# Patient Record
Sex: FEMALE | Race: OTHER | HISPANIC OR LATINO | URBAN - METROPOLITAN AREA
[De-identification: names, ages, dates, MRNs, and addresses within clinical notes are randomized per-mention and may not be internally consistent; named-entity substitution may affect disease eponyms.]

---

## 2023-07-17 ENCOUNTER — OUTPATIENT (OUTPATIENT)
Dept: EMERGENCY DEPT | Facility: HOSPITAL | Age: 28
LOS: 1 days | End: 2023-07-17
Payer: COMMERCIAL

## 2023-07-17 VITALS
HEIGHT: 66.54 IN | SYSTOLIC BLOOD PRESSURE: 112 MMHG | RESPIRATION RATE: 20 BRPM | TEMPERATURE: 98 F | WEIGHT: 127.87 LBS | OXYGEN SATURATION: 99 % | HEART RATE: 90 BPM | DIASTOLIC BLOOD PRESSURE: 76 MMHG

## 2023-07-17 DIAGNOSIS — R10.2 PELVIC AND PERINEAL PAIN: ICD-10-CM

## 2023-07-17 LAB
ALBUMIN SERPL ELPH-MCNC: 4.5 G/DL — SIGNIFICANT CHANGE UP (ref 3.3–5)
ALP SERPL-CCNC: 59 U/L — SIGNIFICANT CHANGE UP (ref 40–120)
ALT FLD-CCNC: 11 U/L — SIGNIFICANT CHANGE UP (ref 10–45)
ANION GAP SERPL CALC-SCNC: 12 MMOL/L — SIGNIFICANT CHANGE UP (ref 5–17)
APPEARANCE UR: CLEAR — SIGNIFICANT CHANGE UP
AST SERPL-CCNC: 13 U/L — SIGNIFICANT CHANGE UP (ref 10–40)
BACTERIA # UR AUTO: NEGATIVE — SIGNIFICANT CHANGE UP
BASOPHILS # BLD AUTO: 0 K/UL — SIGNIFICANT CHANGE UP (ref 0–0.2)
BASOPHILS NFR BLD AUTO: 0 % — SIGNIFICANT CHANGE UP (ref 0–2)
BILIRUB SERPL-MCNC: 0.4 MG/DL — SIGNIFICANT CHANGE UP (ref 0.2–1.2)
BILIRUB UR-MCNC: NEGATIVE — SIGNIFICANT CHANGE UP
BLD GP AB SCN SERPL QL: NEGATIVE — SIGNIFICANT CHANGE UP
BUN SERPL-MCNC: 19 MG/DL — SIGNIFICANT CHANGE UP (ref 7–23)
BURR CELLS BLD QL SMEAR: PRESENT — SIGNIFICANT CHANGE UP
CALCIUM SERPL-MCNC: 9.4 MG/DL — SIGNIFICANT CHANGE UP (ref 8.4–10.5)
CHLORIDE SERPL-SCNC: 105 MMOL/L — SIGNIFICANT CHANGE UP (ref 96–108)
CO2 SERPL-SCNC: 20 MMOL/L — LOW (ref 22–31)
COLOR SPEC: YELLOW — SIGNIFICANT CHANGE UP
CREAT SERPL-MCNC: 0.42 MG/DL — LOW (ref 0.5–1.3)
DIFF PNL FLD: NEGATIVE — SIGNIFICANT CHANGE UP
EGFR: 136 ML/MIN/1.73M2 — SIGNIFICANT CHANGE UP
EOSINOPHIL # BLD AUTO: 0 K/UL — SIGNIFICANT CHANGE UP (ref 0–0.5)
EOSINOPHIL NFR BLD AUTO: 0 % — SIGNIFICANT CHANGE UP (ref 0–6)
EPI CELLS # UR: 3 /HPF — SIGNIFICANT CHANGE UP
GLUCOSE SERPL-MCNC: 121 MG/DL — HIGH (ref 70–99)
GLUCOSE UR QL: NEGATIVE — SIGNIFICANT CHANGE UP
HCG SERPL-ACNC: 414.8 MIU/ML — HIGH
HCT VFR BLD CALC: 37 % — SIGNIFICANT CHANGE UP (ref 34.5–45)
HGB BLD-MCNC: 12.1 G/DL — SIGNIFICANT CHANGE UP (ref 11.5–15.5)
HYALINE CASTS # UR AUTO: 1 /LPF — SIGNIFICANT CHANGE UP (ref 0–2)
KETONES UR-MCNC: ABNORMAL
LEUKOCYTE ESTERASE UR-ACNC: NEGATIVE — SIGNIFICANT CHANGE UP
LYMPHOCYTES # BLD AUTO: 0.38 K/UL — LOW (ref 1–3.3)
LYMPHOCYTES # BLD AUTO: 3.5 % — LOW (ref 13–44)
MANUAL SMEAR VERIFICATION: SIGNIFICANT CHANGE UP
MCHC RBC-ENTMCNC: 28.9 PG — SIGNIFICANT CHANGE UP (ref 27–34)
MCHC RBC-ENTMCNC: 32.7 GM/DL — SIGNIFICANT CHANGE UP (ref 32–36)
MCV RBC AUTO: 88.3 FL — SIGNIFICANT CHANGE UP (ref 80–100)
MONOCYTES # BLD AUTO: 0.18 K/UL — SIGNIFICANT CHANGE UP (ref 0–0.9)
MONOCYTES NFR BLD AUTO: 1.7 % — LOW (ref 2–14)
NEUTROPHILS # BLD AUTO: 10.28 K/UL — HIGH (ref 1.8–7.4)
NEUTROPHILS NFR BLD AUTO: 94.8 % — HIGH (ref 43–77)
NITRITE UR-MCNC: NEGATIVE — SIGNIFICANT CHANGE UP
OVALOCYTES BLD QL SMEAR: SLIGHT — SIGNIFICANT CHANGE UP
PH UR: 5.5 — SIGNIFICANT CHANGE UP (ref 5–8)
PLAT MORPH BLD: NORMAL — SIGNIFICANT CHANGE UP
PLATELET # BLD AUTO: 331 K/UL — SIGNIFICANT CHANGE UP (ref 150–400)
POIKILOCYTOSIS BLD QL AUTO: SLIGHT — SIGNIFICANT CHANGE UP
POTASSIUM SERPL-MCNC: 4 MMOL/L — SIGNIFICANT CHANGE UP (ref 3.5–5.3)
POTASSIUM SERPL-SCNC: 4 MMOL/L — SIGNIFICANT CHANGE UP (ref 3.5–5.3)
PROT SERPL-MCNC: 7.6 G/DL — SIGNIFICANT CHANGE UP (ref 6–8.3)
PROT UR-MCNC: ABNORMAL
RBC # BLD: 4.19 M/UL — SIGNIFICANT CHANGE UP (ref 3.8–5.2)
RBC # FLD: 13.9 % — SIGNIFICANT CHANGE UP (ref 10.3–14.5)
RBC BLD AUTO: ABNORMAL
RBC CASTS # UR COMP ASSIST: 2 /HPF — SIGNIFICANT CHANGE UP (ref 0–4)
RH IG SCN BLD-IMP: POSITIVE — SIGNIFICANT CHANGE UP
SODIUM SERPL-SCNC: 137 MMOL/L — SIGNIFICANT CHANGE UP (ref 135–145)
SP GR SPEC: 1.03 — HIGH (ref 1.01–1.02)
UROBILINOGEN FLD QL: NEGATIVE — SIGNIFICANT CHANGE UP
WBC # BLD: 10.84 K/UL — HIGH (ref 3.8–10.5)
WBC # FLD AUTO: 10.84 K/UL — HIGH (ref 3.8–10.5)
WBC UR QL: 2 /HPF — SIGNIFICANT CHANGE UP (ref 0–5)

## 2023-07-17 RX ORDER — ACETAMINOPHEN 500 MG
650 TABLET ORAL ONCE
Refills: 0 | Status: COMPLETED | OUTPATIENT
Start: 2023-07-17 | End: 2023-07-17

## 2023-07-17 RX ORDER — SODIUM CHLORIDE 9 MG/ML
1000 INJECTION, SOLUTION INTRAVENOUS
Refills: 0 | Status: DISCONTINUED | OUTPATIENT
Start: 2023-07-17 | End: 2023-07-18

## 2023-07-17 RX ORDER — SODIUM CHLORIDE 9 MG/ML
500 INJECTION INTRAMUSCULAR; INTRAVENOUS; SUBCUTANEOUS ONCE
Refills: 0 | Status: DISCONTINUED | OUTPATIENT
Start: 2023-07-17 | End: 2023-07-17

## 2023-07-17 RX ORDER — SODIUM CHLORIDE 9 MG/ML
1000 INJECTION INTRAMUSCULAR; INTRAVENOUS; SUBCUTANEOUS ONCE
Refills: 0 | Status: COMPLETED | OUTPATIENT
Start: 2023-07-17 | End: 2023-07-17

## 2023-07-17 RX ADMIN — SODIUM CHLORIDE 1000 MILLILITER(S): 9 INJECTION INTRAMUSCULAR; INTRAVENOUS; SUBCUTANEOUS at 17:16

## 2023-07-17 NOTE — H&P ADULT - ATTENDING COMMENTS
ATTG note    Pt seen with and agree with above PGY2 note  -  # [id number] used for this consult and consent    Pt is a 29 yo P1 @ 4.6 wks by LMP presented to ED for abd pain/n/v.  Pt found to have +HCG and no IUP on us.  Since presenting to ED, pain and n/v has resolved.  Pt has not required pain medications since presenting to ED.      VSS, afebrile  Gen-NAD  Abd-soft, nd, nt, no rebound, no guarding  Pelvic exam as above    Labs -   CBC 10/12/37/331  MBT- A+  HCG-414.8  TVS-Endometrium: 1.9 cm. Thickened, otherwise within normal limits. No   intrauterine gestational sac is identified.  Right ovary: 4.1cm x 2.6 cm x 4.6 cm. Complex cyst measures 2.9 x 2.3 x   2.6 cm. This contains heterogeneous solid-appearing avascular components   as well as cystic components with peripheral circumferential flow,   compatible with a hemorrhagic corpus luteal cyst. Normal arterial and   venous waveforms.  Left ovary: 2.9 cm x 1.5 cm x 1.7 cm. Within normal limits. Normal   arterial and venous waveforms.  Fluid: Moderate  free fluid with scattered low level echoes in the cul de   sac and both adnexa.    29 yo P1 @ 4.6 wk by dates presents to ED with lower abd pain/n/v.  Pt with elevated HCG in w/u.  H/H stable.  VSS.  Abd exam benign.  TVS-no IUP, complex right cyst - hemorrhagic corpus luteal cyst with moderate free fluid.  Pt is hemodynamically stable.  No acute abdomen.  In the setting of elevated HCG and moderate complex free fluid ddx includes not only ruptured cyst but also possible ruptured ectopic.  Explained ddx to pt and mother.  Offered conservative mngt as this is a desired pregnancy - with rpt exam, rpt CBC with obs vs surgical mngt with Dx LSC.  Pt aware that cannot be certain what source of complex pelvic fluid - pregnancy vs cyst and surgery could include disruption of CL cyst that can jeopardize pregnancy.  Risks for surgery d/w pt including bleeding, infection, injury to surrounding tissue/organs like intestine/bladder/major vessels.  Pt opted for Dx LSC.  Pt consented for Dx LSC, possible cystectomy, possible salpingectomy, possible oopherectomy, possible Ex-lap as clinically indicated.  All questions answered with .  Informed consent signed.    -ADmit to GYN to OR  -OR prep  -NPO since 7am    JOJO Jones

## 2023-07-17 NOTE — H&P ADULT - ASSESSMENT
28y  @4w6d by LMP  presents with abdominal pain and nausea/vomiting since this morning, which have since resolved, found to have +bHCG in the ED.  TVUS notable for hemorrhagic corpus luteal cyst and complex free fluid in adnexa without intrauterine or extrauterine gestation noted, c/w PUL.  Patient hemodynamically and clinically stable, physical exam benign.  Differential for PUL includes ectopic pregnancy, ruptured hemorrhagic corpus luteal cyst, and early intrauterine pregnancy.  Patient offered diagnostic laparoscopy and conservative management with monitoring of H/H and vital signs. Patient desires diagnostic laparoscopy.  Neuro: Pain well-controlled with Tylenol PO  CV: Hemodynamically stable. Monitor VC  Pulm: Saturating well on RA.   GI: NPO for OR  : Patient with hemoperitoneum in the setting of a pregnancy of unknown location  - Patient consented for diagnostic laparoscopy, possible cystectomy, possible oophorectomy, possible exploratory laparotomy, other surgically indicated procedures. Risks and benefits reviewed.  We reiterated the alternative of conservative management with repeating CBC. Patient demonstrates understanding and desires diagnostic laparoscopy.  - Case added on urgently  - Patient is NPO since 7a  Heme: No active issues   FEN: LR@125  Endo: No active issues  ID: No active issues  Dispo: OR    Milagro Barahona, PGY2  Patient seen and examined with Dr. Jones

## 2023-07-17 NOTE — CONSULT NOTE ADULT - ASSESSMENT
28y  @4w6d by LMP  presents with abdominal pain and nausea/vomiting since this morning, which have since resolved, found to have +bHCG in the ED.  TVUS notable for hemorrhagic corpus luteal cyst and complex free fluid in adnexa without intrauterine or extrauterine gestation noted, c/w PUL.  Patient hemodynamically and clinically stable, physical exam benign.  Differential for PUL includes ectopic pregnancy, ruptured hemorrhagic corpus luteal cyst, and early intrauterine pregnancy.       - Patient counseled extensively on TVUS and lab findings with  ID#[id number] by Dr. Gray at bedside  - Offered patient diagnostic laparoscopy for evacuation of hemoperitoneum, however, given that patient is hemodynamically stable at this time and this is a highly desired pregnancy, also offered conservative management with serial H/H and abdominal exams to evaluate for change in clinical status indication further bleeding into abdomen  - If clinical status worsens, recommend emergent add-on to OR for diagnostic laparoscopy  - Patient elects to first discuss options with her partner and her mother, who is at bedside  - Further recommendations pending patient decision and clinical evolution    Patient seen and evaluated with WILLIAM Gray PGY4  d/w Dr. Jones, Service Attending  LINNETTE Wharton PGY2   28y  @4w6d by LMP  presents with abdominal pain and nausea/vomiting since this morning, which have since resolved, found to have +bHCG in the ED.  TVUS notable for hemorrhagic corpus luteal cyst and complex free fluid in adnexa without intrauterine or extrauterine gestation noted, c/w PUL.  Patient hemodynamically and clinically stable, physical exam benign.  Differential for PUL includes ectopic pregnancy, ruptured hemorrhagic corpus luteal cyst, and early intrauterine pregnancy.       - Patient counseled extensively on TVUS and lab findings with  ID#[id number] by Dr. Gray at bedside  - Offered patient diagnostic laparoscopy for evacuation of hemoperitoneum, however, given that patient is hemodynamically stable at this time and this is a highly desired pregnancy, also offered conservative management with serial H/H and abdominal exams to evaluate for change in clinical status indication further bleeding into abdomen  - If clinical status worsens, recommend emergent add-on to OR for diagnostic laparoscopy  - Patient elects to first discuss options with her partner and her mother, who is at bedside  - Further recommendations pending patient decision and clinical evolution    Patient seen and evaluated with WILLIAM Gray PGY4  d/w Dr. Jones, Service Attending  LINNETTE Wharton PGY2      ADDENDUM  9:11PM  Patient seen with Dr. Jones at bedside with . Patient desires a diagnostic laparoscopy. Patient consented for diagnostic laparoscopy, possible cystectomy, possible oophorectomy, possible exploratory laparotomy, other surgically indicated procedures. Risks and benefits reviewed.  We reiterated the alternative of conservative management with repeating CBC. Patient demonstrates understanding and desires diagnostic laparoscopy. She is NPO since 7am.    Milagro Barahona, PGY2  Patient seen and examined with Dr. Jones

## 2023-07-17 NOTE — ED PROVIDER NOTE - CHIEF COMPLAINT
The patient is a 28y Female complaining of abdominal pain.
Alert and oriented x 3, normal mood and affect, no apparent risk to self or others.

## 2023-07-17 NOTE — CONSULT NOTE ADULT - SUBJECTIVE AND OBJECTIVE BOX
GYN Consult Note     ID#[id number] used for encounter    28y  LMP  (@4w6d by LMP) presents with abdominal pain and nausea/vomiting since this morning.  Patient endorses "mild" LLQ pain since this AM that has since resolved.  Also endorses 3 episodes of NB/NB emesis this AM.  Reports nausea and vomiting have also since resolved.  Patient has been actively trying to conceive, and this would be a highly desired pregnancy.  Denies vaginal bleeding.  Denies lightheadedness, dizziness, chest pain, shortness of breath, fevers, chills.      OB/GYN HISTORY:   Physician: Patient sees an OBGYN in [place], does not remember the name of the doctor or the address of the practice, last seen 2 months ago  Ob: FT  (2016)  Gyn: Denies fibroids, cysts, abnormal pap smears, STIs; was taking Depo-Provera for 7 years - last dose May 2022, reports regular menses for the past 3 months.    PMH: Denies    PSH: Denies   Meds: Denies  Allergies: NKDA      Vital Signs Last 24 Hrs  T(C): 36.8 (2023 18:54), Max: 36.8 (2023 18:54)  T(F): 98.3 (2023 18:54), Max: 98.3 (2023 18:54)  HR: 89 (2023 18:54) (89 - 90)  BP: 99/63 (2023 18:54) (99/63 - 112/76)  BP(mean): --  RR: 16 (2023 18:54) (16 - 20)  SpO2: 99% (2023 18:54) (99% - 99%)    Parameters below as of 2023 18:54  Patient On (Oxygen Delivery Method): room air      PHYSICAL EXAM:   Gen: NAD, alert and oriented x 3  Cardiovascular: RRR  Respiratory: breathing comfortably on RA  Abd: soft, non-distended, minimal tenderness to palpation in LLQ - not reproducible on subsequent exam  Pelvic: closed/long, no CMT, Uterus: normal size, non tender  Adnexa: non tender, no palpable masses  Speculum Exam: No active bleeding from os.  Physiologic discharge.    Extremities: non-tender b/l, no edema   Skin: warm and well perfused  *Pelvic exam performed with chaperone present: ED RN Keysha    LABS:                        12.1   10.84 )-----------( 331      ( 2023 15:11 )             37.0         137  |  105  |  19  ----------------------------<  121<H>  4.0   |  20<L>  |  0.42<L>    Ca    9.4      2023 15:11    TPro  7.6  /  Alb  4.5  /  TBili  0.4  /  DBili  x   /  AST  13  /  ALT  11  /  AlkPhos  59        Urinalysis Basic - ( 2023 15:43 )    Color: Yellow / Appearance: Clear / S.027 / pH: x  Gluc: x / Ketone: Moderate  / Bili: Negative / Urobili: Negative   Blood: x / Protein: Trace / Nitrite: Negative   Leuk Esterase: Negative / RBC: 2 /hpf / WBC 2 /HPF   Sq Epi: x / Non Sq Epi: x / Bacteria: Negative    bHC.8    RADIOLOGY & ADDITIONAL STUDIES:  ACC: [id number] EXAM:  US DPLX PELVIC   ORDERED BY: GABY DE LA FUENTE     ACC: [id number] EXAM:  US OB TRANSVAGINAL   ORDERED BY: GABY DE LA FUENTE     PROCEDURE DATE:  2023          INTERPRETATION:  CLINICAL INFORMATION: 28 year old first trimester   pregnancy  with pelvic pain    LMP: 2023    B-HC.8    COMPARISON: None available.    TECHNIQUE:  Endovaginal and transabdominal pelvic sonogram. Color and Spectral   Doppler was performed.    FINDINGS:  Uterus: 7.6 x 5.1 x 5.3 cm. The uterus is anteverted. No fibroids are   identified.  Endometrium: 1.9 cm. Thickened, otherwise within normal limits. No   intrauterine gestational sac is identified.    Right ovary: 4.1cm x 2.6 cm x 4.6 cm. Complex cyst measures 2.9 x 2.3 x   2.6 cm. This contains heterogeneous solid-appearing avascular components   as well as cystic components with peripheral circumferential flow,   compatible with a hemorrhagic corpus luteal cyst. Normal arterial and   venous waveforms.  Left ovary: 2.9 cm x 1.5 cm x 1.7 cm. Within normal limits. Normal   arterial and venous waveforms.    Fluid: Moderate  free fluid with scattered low level echoes in the cul de   sac and both adnexa.      IMPRESSION:  No intrauterine or extrauterine pregnancy is visualized. Given positive   beta hCG, this study represents a pregnancy of unknown location.   Recommend clinical correlation and close short interval follow-up with   serial  beta-hCG levels and repeat pelvic ultrasound in 3 to 5 days, or   as clinically warranted.    The right ovary contains a complex cystic structure compatible with a   hemorrhagic corpus luteal cyst. The left ovary is unremarkable.    Moderate minimally complex free fluid within the cul-de-sac and both   adnexa.    These findings were discussed with ANDREW De La Fuente on 2023 at  5:40 PM.    --- End of Report ---           AYUSH KAMARA MD; Resident Radiologist  This document has been electronically signed.  FE PRATHER MD; Attending Radiologist  This document has been electronically signed. 2023  5:46PM

## 2023-07-17 NOTE — H&P ADULT - NSHPPHYSICALEXAM_GEN_ALL_CORE
Vital Signs Last 24 Hrs  T(C): 36.8 (17 Jul 2023 18:54), Max: 36.8 (17 Jul 2023 18:54)  T(F): 98.3 (17 Jul 2023 18:54), Max: 98.3 (17 Jul 2023 18:54)  HR: 89 (17 Jul 2023 18:54) (89 - 90)  BP: 99/63 (17 Jul 2023 18:54) (99/63 - 112/76)  BP(mean): --  RR: 16 (17 Jul 2023 18:54) (16 - 20)  SpO2: 99% (17 Jul 2023 18:54) (99% - 99%)    Parameters below as of 17 Jul 2023 18:54  Patient On (Oxygen Delivery Method): room air      PHYSICAL EXAM:   Gen: NAD, alert and oriented x 3  Cardiovascular: RRR  Respiratory: breathing comfortably on RA  Abd: soft, non-distended, minimal tenderness to palpation in LLQ - not reproducible on subsequent exam  Pelvic: closed/long, no CMT, Uterus: normal size, non tender  Adnexa: non tender, no palpable masses  Speculum Exam: No active bleeding from os.  Physiologic discharge.    Extremities: non-tender b/l, no edema   Skin: warm and well perfused  *Pelvic exam performed with chaperone present: ED RACHEL Chopra

## 2023-07-17 NOTE — ED PROVIDER NOTE - OBJECTIVE STATEMENT
29 y/o female, Slovak speaking,  ID [id number], LMP 6/13, presents to the ER with complaint of pelvic pain, vomiting and nausea x this morning. states 27 y/o female, Tajik speaking,  ID [id number], LMP 6/13, presents to the ER with complaint of pelvic pain, vomiting and nausea x this morning. states she may be pregnant but is unsure. states pain mostly located to pelvic/suprapubic region. does not radiate. states pain has been constant. denies f/d, CP, SOB, HA, dizziness, cough, urinary symptoms.

## 2023-07-17 NOTE — ED PROVIDER NOTE - NS ED ATTENDING STATEMENT MOD
This was a shared visit with the BRUNO. I reviewed and verified the documentation and independently performed the documented:

## 2023-07-17 NOTE — ED PROVIDER NOTE - PHYSICAL EXAMINATION
: chaperoned by ANDREW Ch. normal exam. no cmt. no adnexal tenderness. no ttp during exam. no vaginal discharge or bleeding

## 2023-07-17 NOTE — H&P ADULT - NSHPLABSRESULTS_GEN_ALL_CORE
Please return here or go to the Emergency Department for any concerns or worsening of condition.  If you were prescribed antibiotics, please take them to completion.  If you were prescribed a narcotic medication, do not drive or operate heavy equipment or machinery while taking these medications.  Please follow up with your primary care doctor or specialist as needed.  If you  smoke, please stop smoking.  Uncertain Causes of Fall  You have had a fall today. But the cause of your fall is not certain. Falls can occur due to slipping, tripping or losing your balance. A fall can also occur from a fainting spell or seizure.  While a fall can happen for a simple reason (tripping over something), falls in elderly people are often caused by a combination of things:  · Age-related decline in function with worsening balance, stability, vision, and muscle strength  · Chronic illness such as heart arrhythmias, heart valve disease, vascular disease, COPD, diabetes, strokes, arthritis  · Effects or side effects of medicines  · Dehydration.  · Environmental hazards such as uneven or slippery ground, unfamiliar place, obstacles, uneven surfaces, or slippery ground  · Situational factors (related to the activity being done, e.g., rushing to the bathroom)  Because the cause of your fall today is not certain, it is possible that a fainting spell or seizure was the cause. This means that it could happen again, without warning. If you fall again, without a cause, then you should return to this facility promptly to have further tests. Otherwise, follow up with your doctor as explained below.  It is normal to feel sore and tight in your muscles and back the next day, and not just the muscles you initially injured. Remember, all the parts of your body are connected, so while initially one area hurts, the next day another may hurt. Also, when you injure yourself, it causes inflammation, which then causes the muscles to tighten up and hurt  more. After the initial worsening, it should gradually improve over the next few days. However, more severe pain should be reported.  Even without a definite head injury, you can still get a concussion. Concussions and even bleeding can still occur, especially if you have had a recent injury or take blood thinner medicine. It is not unusual to have a mild headache and feel tired and even nauseous or dizzy.  Home care  · Rest today and resume your normal activities as soon as you are feeling back to normal. It is best to remain with someone who can check on you for the next 24 hours to watch for another episode of falling.  · If you were injured during the fall, follow the advice from your doctor regarding care of your injury.  ·  If you become light-headed or dizzy, lie down immediately or sit and lean forward with your head down.  · As a precaution, do not drive a car or operate dangerous equipment, do not take a bath alone (use a shower instead) and do not swim alone until you see your doctor. A condition causing fainting or seizures must be ruled out before resuming these activities.  · You may use acetaminophen or ibuprofen to control pain, unless another pain medicine was prescribed. If you have chronic liver or kidney disease or ever had a stomach ulcer or gastrointestinal bleeding, talk with your doctor before using these medicines.  · Keep your appointments for any further testing that may have been scheduled for you.  Follow-up care  Follow up with your healthcare provider, or as advised.  If X-rays or CT scan were done, you will be notified if there is a change in the reading, especially if it affects treatment.  Call 911  Call 911 if any of these occur:  · Trouble breathing  · Confused or difficulty arousing  · Fainting or loss of consciousness  · Rapid or very slow heart rate  · Seizure  · Difficulty with speech or vision, weakness of an arm or leg  · Difficulty walking or talking, loss of balance,  numbness or weakness in one side of your body, facial droop  When to seek medical advice  Call your healthcare provider right away if any of these occur:  · Another unexplained fall  · Dizziness  · Severe headache  · Nausea and vomiting  · Blood in vomit, stools (black or red color)  Date Last Reviewed: 11/5/2015  © 7603-1990 [employer]. [address] [place] [address], [place] [address]. All rights reserved. This information is not intended as a substitute for professional medical care. Always follow your healthcare professional's instructions.         LABS:                        12.1   10.84 )-----------( 331      ( 2023 15:11 )             37.0         137  |  105  |  19  ----------------------------<  121<H>  4.0   |  20<L>  |  0.42<L>    Ca    9.4      2023 15:11    TPro  7.6  /  Alb  4.5  /  TBili  0.4  /  DBili  x   /  AST  13  /  ALT  11  /  AlkPhos  59        Urinalysis Basic - ( 2023 15:43 )    Color: Yellow / Appearance: Clear / S.027 / pH: x  Gluc: x / Ketone: Moderate  / Bili: Negative / Urobili: Negative   Blood: x / Protein: Trace / Nitrite: Negative   Leuk Esterase: Negative / RBC: 2 /hpf / WBC 2 /HPF   Sq Epi: x / Non Sq Epi: x / Bacteria: Negative    bHC.8    RADIOLOGY & ADDITIONAL STUDIES:  ACC: [id number] EXAM:  US DPLX PELVIC   ORDERED BY: GABY DE LA FUENTE     ACC: [id number] EXAM:  US OB TRANSVAGINAL   ORDERED BY: GABY DE LA FUENTE     PROCEDURE DATE:  2023          INTERPRETATION:  CLINICAL INFORMATION: 28 year old first trimester   pregnancy  with pelvic pain    LMP: 2023    B-HC.8    COMPARISON: None available.    TECHNIQUE:  Endovaginal and transabdominal pelvic sonogram. Color and Spectral   Doppler was performed.    FINDINGS:  Uterus: 7.6 x 5.1 x 5.3 cm. The uterus is anteverted. No fibroids are   identified.  Endometrium: 1.9 cm. Thickened, otherwise within normal limits. No   intrauterine gestational sac is identified.    Right ovary: 4.1cm x 2.6 cm x 4.6 cm. Complex cyst measures 2.9 x 2.3 x   2.6 cm. This contains heterogeneous solid-appearing avascular components   as well as cystic components with peripheral circumferential flow,   compatible with a hemorrhagic corpus luteal cyst. Normal arterial and   venous waveforms.  Left ovary: 2.9 cm x 1.5 cm x 1.7 cm. Within normal limits. Normal   arterial and venous waveforms.    Fluid: Moderate  free fluid with scattered low level echoes in the cul de   sac and both adnexa.      IMPRESSION:  No intrauterine or extrauterine pregnancy is visualized. Given positive   beta hCG, this study represents a pregnancy of unknown location.   Recommend clinical correlation and close short interval follow-up with   serial  beta-hCG levels and repeat pelvic ultrasound in 3 to 5 days, or   as clinically warranted.    The right ovary contains a complex cystic structure compatible with a   hemorrhagic corpus luteal cyst. The left ovary is unremarkable.    Moderate minimally complex free fluid within the cul-de-sac and both   adnexa.    These findings were discussed with ANDREW De La Fuente on 2023 at  5:40 PM.    --- End of Report ---           AYUSH KAMARA MD; Resident Radiologist  This document has been electronically signed.  FE PRATHER MD; Attending Radiologist  This document has been electronically signed. 2023  5:46PM

## 2023-07-17 NOTE — ED PROVIDER NOTE - PROGRESS NOTE DETAILS
Brooke Pena PA-C: US results reviewed. spoke with obgyn. pending consult at this time. discussed with ED attending. Brooke Pena PA-C: obgyn recommendations reviewed. will admit patient to their service for further evaluation and management. discussed with ED attending.

## 2023-07-17 NOTE — ED PROVIDER NOTE - ATTENDING APP SHARED VISIT CONTRIBUTION OF CARE
RGUJRAL 28-year-old female no past medical history LMP June 13 presents with left lower quadrant abdominal tenderness since this morning.  Patient also endorses nausea vomiting and dizziness.  Denies any headache recent illness fever or chills.  Patient is sexually active with 1 partner and is concerned about pregnancy.  On exam patient is well-appearing no acute distress mucous membranes moist.  Abdomen soft mild left lower quadrant tender to palpation no rebound or guarding.  No CVA tenderness. .  Patient denies any vaginal discharge.  Pelvic exam done by ANDREW Pena, non tender. Check labs urine UCG beta-hCG ultrasound pelvis to evaluate for ectopic pregnancy intrauterine pregnancy

## 2023-07-17 NOTE — ED ADULT NURSE NOTE - OBJECTIVE STATEMENT
Ambulatory, well-appearing patient in no acute distress complains of pelvic pain x today.  Pt reports pelvic discomfort/pressure began this morning associated with nausea/vomiting.  LMP 6/13 and reports there is a chance she could be pregnant.  Pt conversive, abdomen soft/non-distended/non-tender, skin warm, dry, intact denies chest pain, shortness of breath, cough, fevers, chills, urinary symptoms, vaginal bleeding or discharge.

## 2023-07-18 VITALS
RESPIRATION RATE: 15 BRPM | SYSTOLIC BLOOD PRESSURE: 100 MMHG | HEART RATE: 83 BPM | DIASTOLIC BLOOD PRESSURE: 55 MMHG | OXYGEN SATURATION: 99 %

## 2023-07-18 PROCEDURE — 86850 RBC ANTIBODY SCREEN: CPT

## 2023-07-18 PROCEDURE — 86901 BLOOD TYPING SEROLOGIC RH(D): CPT

## 2023-07-18 PROCEDURE — C9399: CPT

## 2023-07-18 PROCEDURE — 86900 BLOOD TYPING SEROLOGIC ABO: CPT

## 2023-07-18 PROCEDURE — 80053 COMPREHEN METABOLIC PANEL: CPT

## 2023-07-18 PROCEDURE — 36415 COLL VENOUS BLD VENIPUNCTURE: CPT

## 2023-07-18 PROCEDURE — 85025 COMPLETE CBC W/AUTO DIFF WBC: CPT

## 2023-07-18 PROCEDURE — 87086 URINE CULTURE/COLONY COUNT: CPT

## 2023-07-18 PROCEDURE — 76817 TRANSVAGINAL US OBSTETRIC: CPT

## 2023-07-18 PROCEDURE — 84702 CHORIONIC GONADOTROPIN TEST: CPT

## 2023-07-18 PROCEDURE — 81001 URINALYSIS AUTO W/SCOPE: CPT

## 2023-07-18 PROCEDURE — 93975 VASCULAR STUDY: CPT

## 2023-07-18 PROCEDURE — 49320 DIAG LAPARO SEPARATE PROC: CPT

## 2023-07-18 RX ORDER — KETOROLAC TROMETHAMINE 30 MG/ML
30 SYRINGE (ML) INJECTION ONCE
Refills: 0 | Status: DISCONTINUED | OUTPATIENT
Start: 2023-07-18 | End: 2023-07-18

## 2023-07-18 RX ORDER — HYDROMORPHONE HYDROCHLORIDE 2 MG/ML
0.5 INJECTION INTRAMUSCULAR; INTRAVENOUS; SUBCUTANEOUS
Refills: 0 | Status: DISCONTINUED | OUTPATIENT
Start: 2023-07-18 | End: 2023-07-18

## 2023-07-18 RX ORDER — HYDROMORPHONE HYDROCHLORIDE 2 MG/ML
1 INJECTION INTRAMUSCULAR; INTRAVENOUS; SUBCUTANEOUS
Refills: 0 | Status: DISCONTINUED | OUTPATIENT
Start: 2023-07-18 | End: 2023-07-18

## 2023-07-18 RX ORDER — ONDANSETRON 8 MG/1
4 TABLET, FILM COATED ORAL ONCE
Refills: 0 | Status: DISCONTINUED | OUTPATIENT
Start: 2023-07-18 | End: 2023-07-18

## 2023-07-18 RX ORDER — SODIUM CHLORIDE 9 MG/ML
1000 INJECTION, SOLUTION INTRAVENOUS
Refills: 0 | Status: DISCONTINUED | OUTPATIENT
Start: 2023-07-18 | End: 2023-07-18

## 2023-07-18 RX ORDER — ACETAMINOPHEN 500 MG
975 TABLET ORAL ONCE
Refills: 0 | Status: DISCONTINUED | OUTPATIENT
Start: 2023-07-18 | End: 2023-07-18

## 2023-07-18 RX ADMIN — SODIUM CHLORIDE 125 MILLILITER(S): 9 INJECTION, SOLUTION INTRAVENOUS at 00:13

## 2023-07-18 RX ADMIN — SODIUM CHLORIDE 125 MILLILITER(S): 9 INJECTION, SOLUTION INTRAVENOUS at 06:38

## 2023-07-18 NOTE — BRIEF OPERATIVE NOTE - COMMENTS
ATTG note    Read and agree with above findings - s/p Dx LSC under GETA, EBL- < 5cc, Findings - grossly normal uterus, tubes, ovaries, no adnexal masses, no hemoperitoneum, serous pelvic fluid approx -30 cc removed, Dispo - PACU and to home Dictation #[id number]    ATTG note    Read and agree with above findings - s/p Dx LSC under GETA, EBL- < 5cc, Findings - grossly normal uterus, tubes, ovaries, no adnexal masses, no hemoperitoneum, serous pelvic fluid approx -30 cc removed, Dispo - PACU and to home

## 2023-07-18 NOTE — BRIEF OPERATIVE NOTE - OPERATION/FINDINGS
On exam under anesthesia, retroverted mobile 6wk size uterus palpated, adnexa nonpalpable bilaterally  On laparoscopy physiologic pelvic free fluid was visualized. No hemoperitoneum. Uterus, fallopian tubes, and ovaries within normal limits.

## 2023-07-18 NOTE — ASU DISCHARGE PLAN (ADULT/PEDIATRIC) - CARE PROVIDER_API CALL
[facility],   [address] [place] [address] [address], [place] [address] [phone]  Phone: (   )    -  Fax: (   )    -  Follow Up Time:

## 2023-07-18 NOTE — ASU DISCHARGE PLAN (ADULT/PEDIATRIC) - PROVIDER TOKENS
FREE:[LAST:[[facility]],PHONE:[(   )    -],FAX:[(   )    -],ADDRESS:[[address] [place] [address] [address], [place] [address] [phone]]]

## 2023-07-18 NOTE — ASU DISCHARGE PLAN (ADULT/PEDIATRIC) - ASU DC SPECIAL INSTRUCTIONSFT
Postoperative Instructions      Pain control     For pain control, take the followin. Motrin 600mg four times a day, take with food  2. Add Tylenol 650 four times a day, alternated with motrin    Motrin and Tylenol can be obtained over the counter.    Postoperative restrictions   Do not drive or make important decisions for 24 hours after anesthesia. You may feel drowsy for 24 hours and should have a responsible adult with you during that time. No tub baths, pools or hot tubs for 6 weeks (showers are ok!). No lifting anything heavier than 15 lbs, no strenuous exercise for 6 weeks after surgery. Do not pull or cut any stitches that you see around your incision.         Vaginal bleeding   Spotting per vagina is normal in first few days after surgery. If you are soaking 1 pad per hour, that is not normal and you should notify your doctor's office and seek medical attention right away.        Signs of Infection  Some postoperative pain and discomfort is normal. However, if you experience any of the following, you may be developing an infection and should be seen by your doctor: pain that does not get better with the oral medications listed above, fever greater than 100.4F, foul smelling discharge coming from the surgical site, nausea and vomiting that does not stop (especially if you are unable to tolerate oral intake), or inability to urinate. If you experience any of these symptoms, call your doctor's office to be seen right away.    Follow Up  Follow up in the ED in 2 days for a repeat bHCG. Call your doctor's office to schedule a postoperative appointment in 2 weeks. The results from the procedure will be discussed with you at that time.    [address] [place] [address] [address], [place], [place] [id number] [phone] Postoperative Instructions      Pain control  For pain control, take Tylenol 975mg every 6 hours as needed. DO NOT take NSAIDs (Ibuprofen, Motrin, Naproxen, etc.).     Postoperative restrictions   Do not drive or make important decisions for 24 hours after anesthesia. You may feel drowsy for 24 hours and should have a responsible adult with you during that time. No tub baths, pools or hot tubs for 6 weeks (showers are ok!). No lifting anything heavier than 15 lbs, no strenuous exercise for 6 weeks after surgery. Do not pull or cut any stitches that you see around your incision.       Vaginal bleeding   Spotting per vagina is normal in first few days after surgery. If you are soaking 1 pad per hour, that is not normal and you should notify your doctor's office and seek medical attention right away.      Signs of Infection  Some postoperative pain and discomfort is normal. However, if you experience any of the following, you may be developing an infection and should be seen by your doctor: pain that does not get better with the oral medications listed above, fever greater than 100.4F, foul smelling discharge coming from the surgical site, nausea and vomiting that does not stop (especially if you are unable to tolerate oral intake), or inability to urinate. If you experience any of these symptoms, call your doctor's office to be seen right away.    Follow Up  Follow up with your OBGYN or in the ED in 2 days for a repeat bHCG. Call your doctor's office to schedule a postoperative appointment in 2 weeks.     [address] [place] [address] [address], [place] [address] [phone]

## 2023-07-19 ENCOUNTER — EMERGENCY (EMERGENCY)
Facility: HOSPITAL | Age: 28
LOS: 1 days | Discharge: HOME | End: 2023-07-19
Attending: EMERGENCY MEDICINE
Payer: COMMERCIAL

## 2023-07-19 VITALS
DIASTOLIC BLOOD PRESSURE: 80 MMHG | OXYGEN SATURATION: 97 % | TEMPERATURE: 98 F | RESPIRATION RATE: 20 BRPM | WEIGHT: 116.84 LBS | SYSTOLIC BLOOD PRESSURE: 128 MMHG | HEIGHT: 66.54 IN | HEART RATE: 89 BPM

## 2023-07-19 LAB
ALBUMIN SERPL ELPH-MCNC: 3.8 G/DL — SIGNIFICANT CHANGE UP (ref 3.3–5)
ALP SERPL-CCNC: 45 U/L — SIGNIFICANT CHANGE UP (ref 40–120)
ALT FLD-CCNC: 8 U/L — LOW (ref 10–45)
ANION GAP SERPL CALC-SCNC: 11 MMOL/L — SIGNIFICANT CHANGE UP (ref 5–17)
APTT BLD: 30.4 SEC — SIGNIFICANT CHANGE UP (ref 27.5–35.5)
AST SERPL-CCNC: 10 U/L — SIGNIFICANT CHANGE UP (ref 10–40)
BASOPHILS # BLD AUTO: 0.02 K/UL — SIGNIFICANT CHANGE UP (ref 0–0.2)
BASOPHILS NFR BLD AUTO: 0.3 % — SIGNIFICANT CHANGE UP (ref 0–2)
BILIRUB SERPL-MCNC: 0.4 MG/DL — SIGNIFICANT CHANGE UP (ref 0.2–1.2)
BUN SERPL-MCNC: 10 MG/DL — SIGNIFICANT CHANGE UP (ref 7–23)
CALCIUM SERPL-MCNC: 8.5 MG/DL — SIGNIFICANT CHANGE UP (ref 8.4–10.5)
CHLORIDE SERPL-SCNC: 105 MMOL/L — SIGNIFICANT CHANGE UP (ref 96–108)
CO2 SERPL-SCNC: 21 MMOL/L — LOW (ref 22–31)
CREAT SERPL-MCNC: 0.45 MG/DL — LOW (ref 0.5–1.3)
CULTURE RESULTS: SIGNIFICANT CHANGE UP
EGFR: 133 ML/MIN/1.73M2 — SIGNIFICANT CHANGE UP
EOSINOPHIL # BLD AUTO: 0.09 K/UL — SIGNIFICANT CHANGE UP (ref 0–0.5)
EOSINOPHIL NFR BLD AUTO: 1.3 % — SIGNIFICANT CHANGE UP (ref 0–6)
GLUCOSE SERPL-MCNC: 97 MG/DL — SIGNIFICANT CHANGE UP (ref 70–99)
HCG SERPL-ACNC: 671.8 MIU/ML — HIGH
HCT VFR BLD CALC: 32.1 % — LOW (ref 34.5–45)
HGB BLD-MCNC: 10.5 G/DL — LOW (ref 11.5–15.5)
IMM GRANULOCYTES NFR BLD AUTO: 0.3 % — SIGNIFICANT CHANGE UP (ref 0–0.9)
INR BLD: 1.22 RATIO — HIGH (ref 0.88–1.16)
LYMPHOCYTES # BLD AUTO: 2.81 K/UL — SIGNIFICANT CHANGE UP (ref 1–3.3)
LYMPHOCYTES # BLD AUTO: 41.9 % — SIGNIFICANT CHANGE UP (ref 13–44)
MCHC RBC-ENTMCNC: 29.3 PG — SIGNIFICANT CHANGE UP (ref 27–34)
MCHC RBC-ENTMCNC: 32.7 GM/DL — SIGNIFICANT CHANGE UP (ref 32–36)
MCV RBC AUTO: 89.7 FL — SIGNIFICANT CHANGE UP (ref 80–100)
MONOCYTES # BLD AUTO: 0.56 K/UL — SIGNIFICANT CHANGE UP (ref 0–0.9)
MONOCYTES NFR BLD AUTO: 8.4 % — SIGNIFICANT CHANGE UP (ref 2–14)
NEUTROPHILS # BLD AUTO: 3.2 K/UL — SIGNIFICANT CHANGE UP (ref 1.8–7.4)
NEUTROPHILS NFR BLD AUTO: 47.8 % — SIGNIFICANT CHANGE UP (ref 43–77)
NRBC # BLD: 0 /100 WBCS — SIGNIFICANT CHANGE UP (ref 0–0)
PLATELET # BLD AUTO: 254 K/UL — SIGNIFICANT CHANGE UP (ref 150–400)
POTASSIUM SERPL-MCNC: 3.8 MMOL/L — SIGNIFICANT CHANGE UP (ref 3.5–5.3)
POTASSIUM SERPL-SCNC: 3.8 MMOL/L — SIGNIFICANT CHANGE UP (ref 3.5–5.3)
PROT SERPL-MCNC: 6.1 G/DL — SIGNIFICANT CHANGE UP (ref 6–8.3)
PROTHROM AB SERPL-ACNC: 14.2 SEC — HIGH (ref 10.5–13.4)
RBC # BLD: 3.58 M/UL — LOW (ref 3.8–5.2)
RBC # FLD: 14.2 % — SIGNIFICANT CHANGE UP (ref 10.3–14.5)
SODIUM SERPL-SCNC: 137 MMOL/L — SIGNIFICANT CHANGE UP (ref 135–145)
SPECIMEN SOURCE: SIGNIFICANT CHANGE UP
WBC # BLD: 6.7 K/UL — SIGNIFICANT CHANGE UP (ref 3.8–10.5)
WBC # FLD AUTO: 6.7 K/UL — SIGNIFICANT CHANGE UP (ref 3.8–10.5)

## 2023-07-19 PROCEDURE — 76817 TRANSVAGINAL US OBSTETRIC: CPT

## 2023-07-19 PROCEDURE — 85025 COMPLETE CBC W/AUTO DIFF WBC: CPT

## 2023-07-19 PROCEDURE — 99285 EMERGENCY DEPT VISIT HI MDM: CPT | Mod: 25

## 2023-07-19 PROCEDURE — 84702 CHORIONIC GONADOTROPIN TEST: CPT

## 2023-07-19 PROCEDURE — 85730 THROMBOPLASTIN TIME PARTIAL: CPT

## 2023-07-19 PROCEDURE — 93975 VASCULAR STUDY: CPT | Mod: 26

## 2023-07-19 PROCEDURE — 76817 TRANSVAGINAL US OBSTETRIC: CPT | Mod: 26

## 2023-07-19 PROCEDURE — 85610 PROTHROMBIN TIME: CPT

## 2023-07-19 PROCEDURE — 93975 VASCULAR STUDY: CPT

## 2023-07-19 PROCEDURE — 99285 EMERGENCY DEPT VISIT HI MDM: CPT

## 2023-07-19 PROCEDURE — 80053 COMPREHEN METABOLIC PANEL: CPT

## 2023-07-19 NOTE — ED PROVIDER NOTE - OBJECTIVE STATEMENT
27 y/o F presenting for repeat hCG s/p diagnostic laparoscopy yesterday. Was evaluated in ED 2 days ago for abdominal pain, nausea and vomiting. Had a positive hCG but pregnancy was of unknown location. She underwent TVUS and then diagnostic laparoscopy and was dc'd with instructions to followup in 48 hours for repeat hCG. Patient continues to have lower abdominal cramping. Denies fevers, vaginal bleeding/discharge or bleeding from surgical sites 27 y/o F presenting for repeat hCG s/p diagnostic laparoscopy yesterday. Was evaluated in ED 2 days ago for abdominal pain, nausea and vomiting. Had a positive hCG but pregnancy was of unknown location. She underwent TVUS and then diagnostic laparoscopy and was dc'd with instructions to followup in 48 hours for repeat hCG. Patient continues to have lower abdominal cramping, but notes improvement since initial eval. Denies fevers, vaginal bleeding/discharge, dysuria, hematuria, nausea, vomiting or bleeding/discharge from surgical sites. NKDA. 29 y/o F presenting for repeat hCG s/p diagnostic laparoscopy yesterday. Was evaluated in ED 2 days ago for abdominal pain, nausea and vomiting. Had a positive hCG but pregnancy was of unknown location. She underwent TVUS and then diagnostic laparoscopy and was dc'd with instructions to followup in 48 hours for repeat hCG. Patient continues to have lower abdominal cramping, but notes improvement since initial eval. Denies fevers, vaginal bleeding/discharge, dysuria, hematuria, nausea, vomiting or bleeding/discharge from surgical sites. NKDA.    : Mario [id number] 29 y/o F presenting for repeat hCG s/p diagnostic laparoscopy yesterday. Was evaluated in ED 2 days ago for abdominal pain, nausea and vomiting. Had a positive hCG but pregnancy was of unknown location. She underwent TVUS and then diagnostic laparoscopy and was dc'd with instructions to followup in 48 hours for repeat hCG. Patient continues to have lower abdominal cramping, but notes improvement since initial eval. Denies fevers, vaginal bleeding/discharge, dysuria, hematuria, nausea, vomiting or bleeding/discharge from surgical sites. NKDA.  LMP: 6/13  : Mario [id number]

## 2023-07-19 NOTE — CONSULT NOTE ADULT - ASSESSMENT
28y  LMP  presenting to the ED for repeat beta HCG. GYN consulted for PUL. Patient reports no symptoms since discharge yesterday. She is also POD#1 s/p Dx LSC for complex pelvic fluid concerning for ruptured hemorrhagic cyst in the context of PUL. Patient in stable condition, VSS, currently asymptomatic.   -Diagnosis of pregnancy of unknown location reviewed at length with patient- early IUP vs failed IUP vs ectopic pregnancy  -Emphasized need for repeat bhcg in 48 hours to trend hormone levels in order to provide definitive diagnosis   -Recommend f/u in 48 hours for repeat bhcg in ED   -T&S Rh+, Rhogam not indicated  -Ectopic precautions reviewed, pt advised to return to the ED if she experiences intense abdominal pain, vomiting inability to tolerate PO.  -Bleeding precautions reviewed. Pt advised to return to the ED is she is soaking through 1 large pad/hour for > 2 hours, clot passage larger than fist-sized, experiences lightheadedness, dizziness, vomiting, inability to tolerate PO.     [facility] 28[facility]  LMP  presenting to the ED for repeat beta HCG. GYN consulted for PUL. Patient reports no symptoms since discharge yesterday. She is also POD#1 s/p Dx LSC for complex pelvic fluid concerning for ruptured hemorrhagic cyst in the context of PUL. Patient in stable condition, VSS, currently asymptomatic.   -Diagnosis of pregnancy of unknown location reviewed at length with patient- early IUP vs failed IUP vs ectopic pregnancy  -Emphasized need for repeat bhcg in 48 hours to trend hormone levels in order to provide definitive diagnosis   -Recommend f/u in 48 hours for repeat bhcg in ED   -T&S Rh+, Rhogam not indicated  -Ectopic precautions reviewed, pt advised to return to the ED if she experiences intense abdominal pain, vomiting inability to tolerate PO.  -Bleeding precautions reviewed. Pt advised to return to the ED is she is soaking through 1 large pad/hour for > 2 hours, clot passage larger than fist-sized, experiences lightheadedness, dizziness, vomiting, inability to tolerate PO.     D/w Dr. Jones  ula PGY4      ATTG note    Read and agree with above PGY4 note    27 yo P1 LMP  for rpt HCG.  Pt POD#1 s/p Dx LSC on  after presenting with pain and incidental finding of HCG >440, no IUP, and found to have moderate complex fluid concern for ruptured cyst vs ruptured ectopic.  No intraop findings of ruptured ectopic and normal appearing adnexa.  Pt here today for rpt HCG to cont evaluation for PUL.  No complaints today.  Meeting postop milestones.  VSS, Exam as above  HCG-671  TVS-No IUP, in the right adnexa, there is a suspicious paraovarian soft tissue   structure with increased vascularity. Although not demonstrating   the typical appearance, ectopic pregnancy cannot not excluded.  POD#1 s/p Dx LSC for concern for complex fluid in the setting of +HCG and no IUP - normal intraop findings - pt now for rpt HCG today.  No complaints.  VSS.  Benign exam.  Incisions healing well.  HCG increased.   Pt counseled about again concern for PUL - possible early IUP but ectopic still needs to be ruled out.  -No GYN intervention at this time  -Rpt HCG in 48 hrs  -Precautions reviewed    JOJO Jones

## 2023-07-19 NOTE — ED PROVIDER NOTE - PATIENT PORTAL LINK FT
You can access the FollowMyHealth Patient Portal offered by [facility] by registering at the following website: [email or web address]. By joining [employer]’s FollowMyHealth portal, you will also be able to view your health information using other applications (apps) compatible with our system.

## 2023-07-19 NOTE — ED ADULT NURSE NOTE - OBJECTIVE STATEMENT
Patient came to ED for rpt HCG. Patient had ovarian cyst removed on monday after coming to ED for n/v, abd pain. Patient reports that when in ED on Monday they did HCG and told her she was pregnant but fetus was not present during surgery so to come back in 2 days for rpt HCG. Patient reports some mild pain at incision site but site appears to be healing well. Denies n/v/d, fever, chills, sob, vaginal bleeding/discharge, urinary symptoms. Patient A&Ox4, ambulatory. No signs of acute distress at this time. Plan of care in progress.

## 2023-07-19 NOTE — CONSULT NOTE ADULT - SUBJECTIVE AND OBJECTIVE BOX
Gyn Consult Note  OSWALD MCCABE  28y  Female [id number]    HPI: 28y  LMP  presenting to the ED for repeat beta HCG. GYN consulted for PUL. Patient reports no symptoms since discharge yesterday. She is also POD#1 s/p Dx LSC for complex pelvic fluid concerning for ruptured hemorrhagic cyst in the context of PUL. She denies lightheadedness, dizziness, HA, CP, palpitations, N/V, urinary symptoms, and changes in bowel habits.    OBHx:     -  x1  GYNHx: Denies fibroids, cysts, endometriosis, STI's, Abnormal pap smears   - s/p Depo-Provera for 7 years - last dose May 2022, reports regular menses for the past 3 months.    PMHx: Denies    PSHx: Dx LSC (23)  Meds: None  Allergies: NKDA    Vital Signs Last 24 Hrs  T(C): 36.7 (2023 16:12), Max: 36.7 (2023 14:56)  T(F): 98.1 (2023 16:12), Max: 98.1 (2023 14:56)  HR: 75 (2023 16:12) (75 - 89)  BP: 107/62 (2023 16:12) (107/62 - 128/80)  BP(mean): 75 (2023 16:12) (75 - 75)  RR: 19 (2023 16:12) (19 - 20)  SpO2: 97% (2023 16:12) (97% - 97%)    Parameters below as of 2023 16:12  Patient On (Oxygen Delivery Method): room air      Physical Exam:   General: sitting comfortably in bed, NAD   CV: RRR  Lungs: CTAB  Back: No CVA tenderness  Abd: Soft, non-tender, non-distended.  Bowel sounds present.    Incisions: port sites c/d/i w/ steri strips intact  : No bleeding on pad. External labia wnl. Uterus wnl, nontender.   Ext: non-tender b/l, no edema     LABS:             10.5   6.70  )-----------( 254      ( 2023 17:29 )             32.1     07-19  137  |  105  |  10  ----------------------------<  97  3.8   |  21<L>  |  0.45<L>    Ca    8.5      2023 17:29    TPro  6.1  /  Alb  3.8  /  TBili  0.4  /  DBili  x   /  AST  10  /  ALT  8<L>  /  AlkPhos  45      PT/INR - ( 2023 17:29 )   PT: 14.2 sec;   INR: 1.22 ratio       PTT - ( 2023 17:29 )  PTT:30.4 sec    Urinalysis Basic - ( 2023 17:29 )  Color: x / Appearance: x / SG: x / pH: x  Gluc: 97 mg/dL / Ketone: x  / Bili: x / Urobili: x   Blood: x / Protein: x / Nitrite: x   Leuk Esterase: x / RBC: x / WBC x   Sq Epi: x / Non Sq Epi: x / Bacteria: x        RADIOLOGY & ADDITIONAL STUDIES:    < from: US Transvaginal, OB (23 @ 21:00) >  ACC: [id number] EXAM:  US DPLX PELVIC   ORDERED BY: REI MCCANN   ACC: [id number] EXAM:  US OB TRANSVAGINAL   ORDERED BY:  ALLEN BROCK   PROCEDURE DATE:  2023    INTERPRETATION:  CLINICAL INFORMATION: Pregnancy of unknown location,   beta hCG today 671, beta hCG on 717 was 414  LMP: 2023  Estimated Gestational Age by LMP: 5 weeks 1 day  COMPARISON: Pelvic sonogram 2023  Endovaginal and transabdominal pelvic sonogram. Color and Spectral   Doppler was performed.  FINDINGS:  Uterus: Uterus measures 8.5 x 4.9 x 5.4 cm. There is no evidence of   intrauterine pregnancy.  Right ovary: 4.0 x 3.0 x 3.7 cm. Corpus luteum cyst. Normal arterial and   venous waveforms.  Within the right adnexa, separatefrom the right ovary, there is a   hypoechoic structure measuring 1.1 x 1.2 x 1.47 cm (image 2.400-400).   Within this structure, there is increased vascularity.  Left ovary: 1.8 x 2.2 x 1.2 cm. Within normal limits. Normal arterial and   venous waveforms.  Fluid: Smal amount of complex fluid in the bilateral adnexa.  IMPRESSION:  No intrauterine pregnancy.  In the right adnexa, there is a suspicious paraovarian soft tissue   structure with increased vascularity. Although although not demonstrating   the typical appearance, ectopic pregnancy cannot not excluded.    --- End of Report ---   LAINEY BLACKMON MD; Resident Radiologist  This document has been electronically signed.  ALBA BHAT MD; Attending Radiologist  This document hasbeen electronically signed. 2023  9:58PM  < end of copied text >

## 2023-07-19 NOTE — ED ADULT TRIAGE NOTE - CHIEF COMPLAINT QUOTE
Ovarian cyst removal Surgery Monday, need follow up for HCG levels because could not get appt. Denies pain, fever, or feeling unwell

## 2023-07-19 NOTE — ED ADULT NURSE NOTE - NSFALLUNIVINTERV_ED_ALL_ED
Bed/Stretcher in lowest position, wheels locked, appropriate side rails in place/Call bell, personal items and telephone in reach/Instruct patient to call for assistance before getting out of bed/chair/stretcher/Non-slip footwear applied when patient is off stretcher/[place] to call system/Physically safe environment - no spills, clutter or unnecessary equipment/Purposeful proactive rounding/Room/bathroom lighting operational, light cord in reach

## 2023-07-19 NOTE — ED PROVIDER NOTE - SKIN, MLM
Skin normal color for race, warm, dry and intact. No evidence of rash. Two small lacerations from laproscopy noted, both clean, dry, without bleeding or discharge and without surrounding erythema.

## 2023-07-19 NOTE — ED PROVIDER NOTE - CLINICAL SUMMARY MEDICAL DECISION MAKING FREE TEXT BOX
29 y/o F with +hCG, 48 hours s/p diagnostic laparoscopy presenting for repeat hCG. Currently feels well since procedure and is hemodynamically stable. Unable to get outpatient appointment for repeat labs. Will order repeat hCG, and basic labs. TVUS order placed, though pending hCG results and GYN recs, may not be clinically indicated.

## 2023-07-19 NOTE — ED PROVIDER NOTE - NSFOLLOWUPINSTRUCTIONS_ED_ALL_ED_FT
Today you were seen in the ED for repeat hCG blood work.     It was found that you have no signs of medical emergency warranting further evaluation in the emergency department.    A copy of your results including blood work and ultrasound are below.    Please follow up with your OBGYN in 48 hours to have repeat hCG levels drawn.     Please return to the ED if you have any of the following:  -Your pain gets worse or is not helped by medicine.  -You feel dizzy or weak.  -You feel light-headed.  -You faint.  -You have sudden and very bad pain in your belly.  - You experience heavy vaginal bleeding Today you were seen in the ED for repeat hCG blood work.     It was found that you have no signs of medical emergency warranting further evaluation in the emergency department today.     A copy of your results including blood work and ultrasound are below.    Please follow up with your OBGYN in 48 hours to have repeat hCG levels drawn.     Please return to the ED if you have any of the following:  -Your pain gets worse or is not helped by medicine.  -You feel dizzy or weak.  -You feel light-headed.  -You faint.  -You have sudden and very bad pain in your belly.  - You experience heavy vaginal bleeding Today you were seen in the ED for repeat hCG blood work.     You were seen by the OBGYN team in the ER today. They are recommending follow-up in 48 hours for repeat beta hCG levels.     A copy of your results including blood work and ultrasound are below.    Please follow up with your OBGYN or the ED in 48 hours to have repeat hCG levels drawn.     Please return to the ED if you have any of the following:  -Your pain gets worse or is not helped by medicine.  -You feel dizzy or weak.  -You feel light-headed.  -You faint.  -You have sudden and very bad pain in your belly.  - You experience heavy vaginal bleeding

## 2023-07-19 NOTE — ED PROVIDER NOTE - PROGRESS NOTE DETAILS
GYN contacted, recommending repeat hCG/compare to value 48 hours ago. hCG increased but has not doubled from 48 hours ago. paged OBGYN again, who is requesting TVUS and they will see in ED. patient requesting to go home, spoke with patient, mom and , explained situation and needing to rule out pregnancy in a dangerous location. patient agreeable to stay and have ultrasound done. Kieran Zhang MD, FACEP- Ob/gyn called again to see their post surgical patient for consult and will see patient 22:15pm. Lara Gar MD PGY-2: TVUS still equivocal, OB/GYN requesting return to ED or clinic in 2 days for beta-hCG check.  Patient requesting discharge at this time.  Follow-up plan discussed and she was discharged home in stable condition.

## 2023-07-19 NOTE — ED PROVIDER NOTE - ATTENDING CONTRIBUTION TO CARE
Kierna Zhang MD, FACEP: In this physician's medical judgement based on clinical history and physical exam the patient's signs and symptoms lead to differential diagnoses which includes but is not limited to: PUL, IUP  Historical features, symptoms, and clinical exam not consistent with: sepsis, hemorrhagic shock    Labs were ordered and independently reviewed by me.  Imaging was ordered and reviewed by me.      Appropriate medications for the patient's presenting complaints were ordered, and effects were reassessed.     Patient's records including prior hospital visit, med and medical history were reviewed.   Escalation to admission/observation was considered.    Will follow up on labs, therapeutics, imaging, reassess and disposition as clinically indicated.  *The above represents an initial assessment/impression. Please refer to my progress notes below for potential changes in patient clinical course*     The patient was serially evaluated throughout emergency department course by the team. There was no acute deterioration up to this time in the emergency department. The patient has demonstrated clinical improvement and/or stability, feels better at this time according to emergency department team. Agree with goals/plan of emergency department care as described in this physician's electronic medical record, including diagnostics, therapeutics and consultation recommendation as clinically warranted. Will discharge home with close outpatient follow up with primary care physician/provider and specialist if necessary. The patient and/or family was educated on expectant management and return precautions concerning signs and features to return to the emergency department, in layman terms, including but not limited to: nausea, vomiting, fever, chills, the inability to eat, take medications, or drink, persistent/worsening symptoms or any concerns at all. There are no acute or immediate life threatening issues present on history, clinical exam, or any diagnostic evaluation. The patient is a safe disposition home, has capacity and insight into their condition, is ambulatory in the Emergency Department with no further questions and will follow up with their doctor(s) this week. Diagnosis, prognosis, natural history and treatment was discussed with patient and/or family. The patient and/or family were given the opportunity to ask questions and have them answered in full. The patient and/or family are with capacity and insight into the situation, treatment, risks, benefits, alternative therapies, and understand that they can ask any further questions if needed. Patient and/or family/guardian understands anticipatory guidance and was given strict return and follow up precautions. The patient and/or family/guardian has been informed of the necessity to follow up with the PMD/Clinic/follow up as provided within 2-3 days, and the patient and/or family/guardian reports understanding of above with capacity and insight. The patient and/or family/guardian were informed of any results of their tests and are were encouraged to follow up on the findings with their doctor as well as the need to inform their doctor of any results. The patient and/or family/guardian are aware of the need to follow up with repeat testing as applicable and report understanding of the above with capacity and insight. The patient and/or family/guardian was made aware of any pending test results at the time of discharge and of the need to call back for the final results as well as the need to inform their doctor of the results. Kieran Zhang MD, FACEP: In this physician's medical judgement based on clinical history and physical exam the patient's signs and symptoms lead to differential diagnoses which includes but is not limited to: PUL, IUP, ectopic pregnancy   Historical features, symptoms, and clinical exam not consistent with: sepsis, hemorrhagic shock    Labs were ordered and independently reviewed by me.  Imaging was ordered and reviewed by me.      Appropriate medications for the patient's presenting complaints were ordered, and effects were reassessed.     Patient's records including prior hospital visit, med and medical history were reviewed.   Escalation to admission/observation was considered.    Will follow up on labs, therapeutics, imaging, reassess and disposition as clinically indicated.  *The above represents an initial assessment/impression. Please refer to my progress notes below for potential changes in patient clinical course*

## 2023-07-20 VITALS
HEART RATE: 59 BPM | SYSTOLIC BLOOD PRESSURE: 107 MMHG | TEMPERATURE: 98 F | OXYGEN SATURATION: 98 % | RESPIRATION RATE: 17 BRPM | DIASTOLIC BLOOD PRESSURE: 67 MMHG

## 2023-07-23 NOTE — CHART NOTE - NSCHARTNOTEFT_GEN_A_CORE
Called patient with  ID#[id number] to follow up on repeat bHCG testing.  Patient states that she is currently in a waiting room in an OBGYN outpatient office in [place].  Patient does not know name of OBGYN office, but provided [address] as address for office.  Stated she found this office on the internet.  Plans to follow up with this office as [facility] is too far from patient's home.  Patient endorses feeling well.  Denies vaginal bleeding, abdominal pain.  ED return precautions discussed with patient.    LINNETTE Wharton PGY2
Called patient with  ID#[id number] to follow up on bHCG testing.  States she had her blood drawn at the [facility] on Friday, but did not receive results and is unsure if she will be able to continue following with that office for repeat bHCG testing.  Patient to call that office tomorrow to see if she can schedule repeat appointment.  If she is unable to schedule appointment, she will return to [facility] ED for repeat testing.  Patient states she is feeling well, denies bleeding, cramping, fevers, chills.  Also reminded patient to schedule post-op appointment with [facility] Gyn clinic next week (week of 7/31).  Phone number for clinic provided.  Patient expressed understanding.     LINNETTE Wharton PGY2

## 2023-07-24 ENCOUNTER — EMERGENCY (EMERGENCY)
Facility: HOSPITAL | Age: 28
LOS: 1 days | Discharge: HOME | End: 2023-07-24
Attending: EMERGENCY MEDICINE
Payer: COMMERCIAL

## 2023-07-24 VITALS
SYSTOLIC BLOOD PRESSURE: 112 MMHG | HEART RATE: 84 BPM | OXYGEN SATURATION: 99 % | HEIGHT: 66.54 IN | RESPIRATION RATE: 15 BRPM | WEIGHT: 130.07 LBS | TEMPERATURE: 97 F | DIASTOLIC BLOOD PRESSURE: 71 MMHG

## 2023-07-24 VITALS
OXYGEN SATURATION: 100 % | HEART RATE: 78 BPM | DIASTOLIC BLOOD PRESSURE: 64 MMHG | TEMPERATURE: 98 F | SYSTOLIC BLOOD PRESSURE: 107 MMHG | RESPIRATION RATE: 17 BRPM

## 2023-07-24 PROBLEM — Z78.9 OTHER SPECIFIED HEALTH STATUS: Chronic | Status: ACTIVE | Noted: 2023-07-19

## 2023-07-24 LAB
ALBUMIN SERPL ELPH-MCNC: 4.7 G/DL — SIGNIFICANT CHANGE UP (ref 3.3–5)
ALP SERPL-CCNC: 56 U/L — SIGNIFICANT CHANGE UP (ref 40–120)
ALT FLD-CCNC: 20 U/L — SIGNIFICANT CHANGE UP (ref 10–45)
ANION GAP SERPL CALC-SCNC: 13 MMOL/L — SIGNIFICANT CHANGE UP (ref 5–17)
APPEARANCE UR: CLEAR — SIGNIFICANT CHANGE UP
APPEARANCE UR: CLEAR — SIGNIFICANT CHANGE UP
AST SERPL-CCNC: 13 U/L — SIGNIFICANT CHANGE UP (ref 10–40)
BACTERIA # UR AUTO: ABNORMAL
BACTERIA # UR AUTO: ABNORMAL
BASOPHILS # BLD AUTO: 0.02 K/UL — SIGNIFICANT CHANGE UP (ref 0–0.2)
BASOPHILS NFR BLD AUTO: 0.3 % — SIGNIFICANT CHANGE UP (ref 0–2)
BILIRUB SERPL-MCNC: 0.3 MG/DL — SIGNIFICANT CHANGE UP (ref 0.2–1.2)
BILIRUB UR-MCNC: NEGATIVE — SIGNIFICANT CHANGE UP
BILIRUB UR-MCNC: NEGATIVE — SIGNIFICANT CHANGE UP
BUN SERPL-MCNC: 15 MG/DL — SIGNIFICANT CHANGE UP (ref 7–23)
CALCIUM SERPL-MCNC: 9.6 MG/DL — SIGNIFICANT CHANGE UP (ref 8.4–10.5)
CHLORIDE SERPL-SCNC: 101 MMOL/L — SIGNIFICANT CHANGE UP (ref 96–108)
CO2 SERPL-SCNC: 24 MMOL/L — SIGNIFICANT CHANGE UP (ref 22–31)
COLOR SPEC: COLORLESS — SIGNIFICANT CHANGE UP
COLOR SPEC: SIGNIFICANT CHANGE UP
CREAT SERPL-MCNC: 0.47 MG/DL — LOW (ref 0.5–1.3)
DIFF PNL FLD: NEGATIVE — SIGNIFICANT CHANGE UP
DIFF PNL FLD: NEGATIVE — SIGNIFICANT CHANGE UP
EGFR: 132 ML/MIN/1.73M2 — SIGNIFICANT CHANGE UP
EOSINOPHIL # BLD AUTO: 0.11 K/UL — SIGNIFICANT CHANGE UP (ref 0–0.5)
EOSINOPHIL NFR BLD AUTO: 1.4 % — SIGNIFICANT CHANGE UP (ref 0–6)
EPI CELLS # UR: 2 /HPF — SIGNIFICANT CHANGE UP
EPI CELLS # UR: 6 /HPF — HIGH
GLUCOSE SERPL-MCNC: 101 MG/DL — HIGH (ref 70–99)
GLUCOSE UR QL: NEGATIVE — SIGNIFICANT CHANGE UP
GLUCOSE UR QL: NEGATIVE — SIGNIFICANT CHANGE UP
HCG SERPL-ACNC: 3140 MIU/ML — HIGH
HCT VFR BLD CALC: 39 % — SIGNIFICANT CHANGE UP (ref 34.5–45)
HGB BLD-MCNC: 12.7 G/DL — SIGNIFICANT CHANGE UP (ref 11.5–15.5)
HYALINE CASTS # UR AUTO: 2 /LPF — SIGNIFICANT CHANGE UP (ref 0–2)
HYALINE CASTS # UR AUTO: 3 /LPF — HIGH (ref 0–2)
IMM GRANULOCYTES NFR BLD AUTO: 0.4 % — SIGNIFICANT CHANGE UP (ref 0–0.9)
KETONES UR-MCNC: NEGATIVE — SIGNIFICANT CHANGE UP
KETONES UR-MCNC: NEGATIVE — SIGNIFICANT CHANGE UP
LEUKOCYTE ESTERASE UR-ACNC: NEGATIVE — SIGNIFICANT CHANGE UP
LEUKOCYTE ESTERASE UR-ACNC: NEGATIVE — SIGNIFICANT CHANGE UP
LYMPHOCYTES # BLD AUTO: 2.36 K/UL — SIGNIFICANT CHANGE UP (ref 1–3.3)
LYMPHOCYTES # BLD AUTO: 29.9 % — SIGNIFICANT CHANGE UP (ref 13–44)
MCHC RBC-ENTMCNC: 28.9 PG — SIGNIFICANT CHANGE UP (ref 27–34)
MCHC RBC-ENTMCNC: 32.6 GM/DL — SIGNIFICANT CHANGE UP (ref 32–36)
MCV RBC AUTO: 88.6 FL — SIGNIFICANT CHANGE UP (ref 80–100)
MONOCYTES # BLD AUTO: 0.56 K/UL — SIGNIFICANT CHANGE UP (ref 0–0.9)
MONOCYTES NFR BLD AUTO: 7.1 % — SIGNIFICANT CHANGE UP (ref 2–14)
NEUTROPHILS # BLD AUTO: 4.8 K/UL — SIGNIFICANT CHANGE UP (ref 1.8–7.4)
NEUTROPHILS NFR BLD AUTO: 60.9 % — SIGNIFICANT CHANGE UP (ref 43–77)
NITRITE UR-MCNC: NEGATIVE — SIGNIFICANT CHANGE UP
NITRITE UR-MCNC: NEGATIVE — SIGNIFICANT CHANGE UP
NRBC # BLD: 0 /100 WBCS — SIGNIFICANT CHANGE UP (ref 0–0)
PH UR: 7 — SIGNIFICANT CHANGE UP (ref 5–8)
PH UR: 7.5 — SIGNIFICANT CHANGE UP (ref 5–8)
PLATELET # BLD AUTO: 300 K/UL — SIGNIFICANT CHANGE UP (ref 150–400)
POTASSIUM SERPL-MCNC: 4.1 MMOL/L — SIGNIFICANT CHANGE UP (ref 3.5–5.3)
POTASSIUM SERPL-SCNC: 4.1 MMOL/L — SIGNIFICANT CHANGE UP (ref 3.5–5.3)
PROT SERPL-MCNC: 7.5 G/DL — SIGNIFICANT CHANGE UP (ref 6–8.3)
PROT UR-MCNC: NEGATIVE — SIGNIFICANT CHANGE UP
PROT UR-MCNC: NEGATIVE — SIGNIFICANT CHANGE UP
RBC # BLD: 4.4 M/UL — SIGNIFICANT CHANGE UP (ref 3.8–5.2)
RBC # FLD: 13.9 % — SIGNIFICANT CHANGE UP (ref 10.3–14.5)
RBC CASTS # UR COMP ASSIST: 1 /HPF — SIGNIFICANT CHANGE UP (ref 0–4)
RBC CASTS # UR COMP ASSIST: 3 /HPF — SIGNIFICANT CHANGE UP (ref 0–4)
SODIUM SERPL-SCNC: 138 MMOL/L — SIGNIFICANT CHANGE UP (ref 135–145)
SP GR SPEC: 1.01 — SIGNIFICANT CHANGE UP (ref 1.01–1.02)
SP GR SPEC: 1.02 — SIGNIFICANT CHANGE UP (ref 1.01–1.02)
UROBILINOGEN FLD QL: NEGATIVE — SIGNIFICANT CHANGE UP
UROBILINOGEN FLD QL: NEGATIVE — SIGNIFICANT CHANGE UP
WBC # BLD: 7.88 K/UL — SIGNIFICANT CHANGE UP (ref 3.8–10.5)
WBC # FLD AUTO: 7.88 K/UL — SIGNIFICANT CHANGE UP (ref 3.8–10.5)
WBC UR QL: 2 /HPF — SIGNIFICANT CHANGE UP (ref 0–5)
WBC UR QL: 5 /HPF — SIGNIFICANT CHANGE UP (ref 0–5)

## 2023-07-24 PROCEDURE — 87086 URINE CULTURE/COLONY COUNT: CPT

## 2023-07-24 PROCEDURE — 85025 COMPLETE CBC W/AUTO DIFF WBC: CPT

## 2023-07-24 PROCEDURE — 81001 URINALYSIS AUTO W/SCOPE: CPT

## 2023-07-24 PROCEDURE — 76817 TRANSVAGINAL US OBSTETRIC: CPT

## 2023-07-24 PROCEDURE — 80053 COMPREHEN METABOLIC PANEL: CPT

## 2023-07-24 PROCEDURE — 99284 EMERGENCY DEPT VISIT MOD MDM: CPT | Mod: 25

## 2023-07-24 PROCEDURE — 84702 CHORIONIC GONADOTROPIN TEST: CPT

## 2023-07-24 PROCEDURE — 36415 COLL VENOUS BLD VENIPUNCTURE: CPT

## 2023-07-24 PROCEDURE — 76817 TRANSVAGINAL US OBSTETRIC: CPT | Mod: 26

## 2023-07-24 PROCEDURE — 99284 EMERGENCY DEPT VISIT MOD MDM: CPT

## 2023-07-24 RX ORDER — IBUPROFEN 200 MG
1 TABLET ORAL
Qty: 0 | Refills: 0 | DISCHARGE

## 2023-07-24 RX ORDER — ACETAMINOPHEN 500 MG
2 TABLET ORAL
Qty: 0 | Refills: 0 | DISCHARGE

## 2023-07-24 RX ORDER — CEPHALEXIN 500 MG
500 CAPSULE ORAL ONCE
Refills: 0 | Status: COMPLETED | OUTPATIENT
Start: 2023-07-24 | End: 2023-07-24

## 2023-07-24 RX ORDER — CEPHALEXIN 500 MG
1 CAPSULE ORAL
Qty: 20 | Refills: 0
Start: 2023-07-24 | End: 2023-07-28

## 2023-07-24 RX ADMIN — Medication 500 MILLIGRAM(S): at 13:47

## 2023-07-24 NOTE — ED PROVIDER NOTE - PATIENT PORTAL LINK FT
You can access the FollowMyHealth Patient Portal offered by [facility] by registering at the following website: [email or web address]. By joining [facility]’s FollowMyHealth portal, you will also be able to view your health information using other applications (apps) compatible with our system.

## 2023-07-24 NOTE — ED PROVIDER NOTE - ATTENDING CONTRIBUTION TO CARE
Griffin Allen MD:  I personally saw the patient and performed a substantive portion of the visit including all aspects of the medical decision making.    MDM: 28-year-old female who is , LMP , who presents for repeat hCG.  Patient with recent diagnostic laparoscopy on  for complex pelvic fluid concerning for ruptured hemorrhagic cyst, had elevated hCG with pregnancy of unknown location.  hCG on  of 414, subsequent hCG on  of 671.  Patient at this time is asymptomatic with no fevers, chills, nausea, vomiting, diarrhea, constipation, hematuria, dysuria, vaginal bleeding/spotting, pelvic or abdominal pain, flank pain.    Patient was seen at outpatient office and had obtained repeat hCG, but unknown of the results.    On examination, patient with stable vitals, well-appearing, nontoxic.  Cardiac RRR, lungs CTAB, abdomen soft nontender, nondistended, no guarding/rebound/rigidity, no CVA tenderness.      Will obtain labs to evaluate for hematologic disorder, metabolic derangements, hepatic and renal function, and beta-hCG, urinalysis.  Reviewed prior labs which showed patient's type and screen is type A, Rh positive.  Transvaginal ultrasound from previous visit showed suspicious paraovarian soft tissue structure in the right adnexa with increased vascularity, cannot exclude ectopic pregnancy.  Will obtain repeat ultrasound.    Review of labs showed improvement from prior hemoglobin of 10.5.  Uptrend of hCG from 6 7 1-3 140, CMP otherwise unremarkable.    Differential includes but is not limited to: See above    Patient with new problems requiring additional work-up and treatment, following orders: see above  Discussed case with: OB/GYN  Obtained and reviewed external records: Brief op note from 2023 to obtain report of patient's recent procedure  Additional history obtained from: N/A  Chronic conditions and social determinants of health affecting care: See above Griffin Allen MD:  I personally saw the patient and performed a substantive portion of the visit including all aspects of the medical decision making.    MDM: 28-year-old female who is , LMP , who presents for repeat hCG.  Patient with recent diagnostic laparoscopy on  for complex pelvic fluid concerning for ruptured hemorrhagic cyst, had elevated hCG with pregnancy of unknown location.  hCG on  of 414, subsequent hCG on  of 671.  Patient at this time is asymptomatic with no fevers, chills, nausea, vomiting, diarrhea, constipation, hematuria, dysuria, vaginal bleeding/spotting, pelvic or abdominal pain, flank pain.    Patient was seen at outpatient office and had obtained repeat hCG, but unknown of the results.    On examination, patient with stable vitals, well-appearing, nontoxic.  Cardiac RRR, lungs CTAB, abdomen soft nontender, nondistended, no guarding/rebound/rigidity, no CVA tenderness.      Will obtain labs to evaluate for hematologic disorder, metabolic derangements, hepatic and renal function, and beta-hCG, urinalysis.  Reviewed prior labs which showed patient's type and screen is type A, Rh positive.  Transvaginal ultrasound from previous visit showed suspicious paraovarian soft tissue structure in the right adnexa with increased vascularity, cannot exclude ectopic pregnancy.  Will obtain repeat ultrasound.    Review of labs showed improvement from prior hemoglobin of 10.5.  Uptrend of hCG from 671-3140, CMP otherwise unremarkable.     TVUS with single intrauterine gestation with yolk sac and no fetal pole   visualized. Estimated gestational age of 5 weeks and 0 days. Recommend   follow-up pelvic ultrasound examination to assess viability of   intrauterine gestation. And previously visualized right adnexal hypoechoic structure is not   appreciated on this examination.    Discussed w/ OBGYN resident who was made aware of the labs and imaging, and deemed stable for outpatient management. No further diagnostic or therapeutic interventions recommended.     Patient reports that their symptoms have improved. Repeat abdominal examination shows no significant tenderness, no peritoneal signs including rebound or guarding. Patient able to tolerate PO. Stable for discharge with close follow-up and strict return precautions. The patient has been informed of all concerning signs and symptoms to return to Emergency Department, the necessity to follow up with PMD within 1-2 days and OBGYN 2-3 days was explained, or to return to the ED if unable to follow-up appropriately, and the patient reports understanding of above with capacity and insight.     Differential includes but is not limited to: See above    Patient with new problems requiring additional work-up and treatment, following orders: see above  Discussed case with: OB/GYN  Obtained and reviewed external records: Brief op note from 2023 to obtain report of patient's recent procedure  Additional history obtained from: N/A  Chronic conditions and social determinants of health affecting care: See above Griffin Allen MD:  I personally saw the patient and performed a substantive portion of the visit including all aspects of the medical decision making.    MDM: 28-year-old female who is , LMP , who presents for repeat hCG.  Patient with recent diagnostic laparoscopy on  for complex pelvic fluid concerning for ruptured hemorrhagic cyst, had elevated hCG with pregnancy of unknown location.  hCG on  of 414, subsequent hCG on  of 671.  Patient at this time is asymptomatic with no fevers, chills, nausea, vomiting, diarrhea, constipation, hematuria, dysuria, vaginal bleeding/spotting, pelvic or abdominal pain, flank pain.    Patient was seen at outpatient office and had obtained repeat hCG, but unknown of the results.    On examination, patient with stable vitals, well-appearing, nontoxic.  Cardiac RRR, lungs CTAB, abdomen soft nontender, nondistended, no guarding/rebound/rigidity, no CVA tenderness.      Will obtain labs to evaluate for hematologic disorder, metabolic derangements, hepatic and renal function, and beta-hCG, urinalysis.  Reviewed prior labs which showed patient's type and screen is type A, Rh positive.  Transvaginal ultrasound from previous visit showed suspicious paraovarian soft tissue structure in the right adnexa with increased vascularity, cannot exclude ectopic pregnancy.  Will obtain repeat ultrasound.    Review of labs showed improvement from prior hemoglobin of 10.5.  Uptrend of hCG from 671-3140, CMP otherwise unremarkable.     TVUS with single intrauterine gestation with yolk sac and no fetal pole   visualized. Estimated gestational age of 5 weeks and 0 days. Recommend   follow-up pelvic ultrasound examination to assess viability of   intrauterine gestation. And previously visualized right adnexal hypoechoic structure is not   appreciated on this examination.    Discussed w/ OBGYN resident who was made aware of the labs and imaging, and deemed stable for outpatient management. No further diagnostic or therapeutic interventions recommended. We will give antibiotics for asymptomatic bacteriuria in pregnancy    Patient reports that their symptoms have improved. Repeat abdominal examination shows no significant tenderness, no peritoneal signs including rebound or guarding. Patient able to tolerate PO. Stable for discharge with close follow-up and strict return precautions. The patient has been informed of all concerning signs and symptoms to return to Emergency Department, the necessity to follow up with PMD within 1-2 days and OBGYN 2-3 days was explained, or to return to the ED if unable to follow-up appropriately, and the patient reports understanding of above with capacity and insight.     Differential includes but is not limited to: See above    Patient with new problems requiring additional work-up and treatment, following orders: see above  Discussed case with: OB/GYN  Obtained and reviewed external records: Brief op note from 2023 to obtain report of patient's recent procedure  Additional history obtained from: N/A  Chronic conditions and social determinants of health affecting care: See above

## 2023-07-24 NOTE — ED ADULT NURSE NOTE - NSFALLUNIVINTERV_ED_ALL_ED
Bed/Stretcher in lowest position, wheels locked, appropriate side rails in place/Call bell, personal items and telephone in reach/Instruct patient to call for assistance before getting out of bed/chair/stretcher/Non-slip footwear applied when patient is off stretcher/[place] to call system/Physically safe environment - no spills, clutter or unnecessary equipment/Purposeful proactive rounding/Room/bathroom lighting operational, light cord in reach

## 2023-07-24 NOTE — ED PROVIDER NOTE - OBJECTIVE STATEMENT
28-year-old female -0-0-1 MP  presents for pregnancy check.  Has been followed up with serial beta hCGs and ultrasound.  Currently without any vaginal bleeding, fevers, chills, abdominal pain, palpitations.  No urinary symptoms.

## 2023-07-24 NOTE — ED PROVIDER NOTE - PROGRESS NOTE DETAILS
JACQUELINE Regalado PGY3 significant uptrend in beta hcg. concerning that abnormal ovarian lesion on prior US may be an ectopic pregnancy. pt currently at US, will follow up results. JACQUELINE Regalado PGY3 called OB as they have been following pt for serial hcg/US. ok for outpatient follow up.

## 2023-07-24 NOTE — ED ADULT NURSE NOTE - OBJECTIVE STATEMENT
27 y/o female w/ no pertinent medical hx coming in for repeat HCG level. pt states she was in the emergency room last Monday complaining of nausea, vomiting and dizziness, she had a positive pregnancy result and ultrasound was unclear, pt was asked to followup with provider to check HCG levels, pt is  normal full term delivery, LMP 23.  pt A&Ox3. Denies HA, dizziness, blurry vision. Respirations spontaneous and unlabored. Denies SOB, dyspnea, cough, CP, palpitations. Denies abd pain, N/V/D/C, no cramps or bleeding.  Abd soft NT/ND. Denies urinary symptoms. Denies fever, chills. No sick contacts. Skin intact, warm, dry, normal for race.  Ambulates at baseline.

## 2023-07-24 NOTE — ED PROVIDER NOTE - NSFOLLOWUPINSTRUCTIONS_ED_ALL_ED_FT
Usted fue vista en el Departamento de Emergencias por embarazo.    1) Avance en la actividad según lo tolere.  2) Continúe con todos los medicamentos recetados previamente según las indicaciones.  3) Alex un seguimiento con shankar médico de atención primaria en 24 a 48 horas: tome copias de leonel resultados.  4) Regrese al Departamento de Emergencias por empeoramiento o síntomas persistentes, y/o CUALQUIER SÍNTOMA NUEVO O PREOCUPANTE.    La ecografía muestra un embarazo en el útero. Alex un seguimiento con un obstetra para el control de rutina de shankar embarazo. regrese a la kyara de emergencias por cualquier dolor abdominal significativo, dolor al orinar, sangrado vaginal, dificultad para respirar, dolor en el pecho o cualquier otro síntoma nuevo o preocupante.    You were seen in the Emergency Department for pregnancy.     1) Advance activity as tolerated.   2) Continue all previously prescribed medications as directed.    3) Follow up with your primary care physician in 24-48 hours - take copies of your results.    4) Return to the Emergency Department for worsening or persistent symptoms, and/or ANY NEW OR CONCERNING SYMPTOMS.    The ultrasound shows a pregnancy in the uterus. Please follow up with an obstetrician for routine monitoring of your pregnancy. return to the ER for any significant abdominal pain, pain with urination, vaginal bleeding, shortness of breath, chest pain, or any other new or concerning symptoms. Usted fue vista en el Departamento de Emergencias por embarazo.    1) Avance en la actividad según lo tolere.  2) Continúe con todos los medicamentos recetados previamente según las indicaciones.  3) Alex un seguimiento con shankar médico de atención primaria en 24 a 48 horas: tome copias de leonel resultados.  4) Regrese al Departamento de Emergencias por empeoramiento o síntomas persistentes, y/o CUALQUIER SÍNTOMA NUEVO O PREOCUPANTE.    La ecografía muestra un embarazo en el útero. Alex un seguimiento con un obstetra para el control de rutina de shankar embarazo. regrese a la kyara de emergencias por cualquier dolor abdominal significativo, dolor al orinar, sangrado vaginal, dificultad para respirar, dolor en el pecho o cualquier otro síntoma nuevo o preocupante.    Dolor abdominal charmaine el embarazo  Abdominal Pain During Pregnancy       El dolor de vientre (abdominal) es habitual charmaine el embarazo. Hay muchas causas posibles. Generalmente, no se trata de un problema grave. Otras veces puede ser un signo de que algo no shirley emily. Siempre comuníquese con shankar médico si tiene dolor abdominal.    Siga estas indicaciones en shankar casa:  No tenga relaciones sexuales ni se coloque nada dentro de la vagina hasta que el dolor haya desaparecido completamente.  Descanse todo lo que pueda hasta que el dolor se le haya calmado.  Blaire suficiente líquido rudy para mantener el pis (orina) de color amarillo pálido.  [place] los medicamentos de venta re y los recetados solamente rudy se lo haya indicado el médico.  Concurra a todas las visitas de control rudy se lo haya indicado el médico. [place] es importante.    Comuníquese con un médico si:  El dolor continúa o empeora después de descansar.  Siente dolor en la parte inferior del abdomen que:    Va y viene en intervalos regulares.  Se extiende a la espalda.  Se siente rudy cólicos menstruales.  Siente dolor o ardor al orinar.    Solicite ayuda de inmediato si:  Tiene fiebre o siente escalofríos.  Tiene harry hemorragia vaginal abundante.  Tiene harry pérdida de líquido por la vagina.  Elimina tejidos por la vagina.  Vomita charmaine más de 24 horas.  Tiene heces líquidas (diarrea) charmaine más de 24 horas.  El bebé se mueve menos de lo habitual.  Se siente débil o se desmaya.  Le falta el aire.  Tiene dolor muy intenso en la parte superior del abdomen.    Resumen  El dolor de vientre (abdominal) es habitual charmaine el embarazo. Hay muchas causas posibles.  Si tiene dolor en el abdomen charmaine el embarazo, avise al médico de inmediato.  Concurra a todas las visitas de control rudy se lo haya indicado el médico. [place] es importante.    NOTAS ADICIONALES E INSTRUCCIONES    Please follow up with your Primary MD in 24-48 hr.  Seek immediate medical care for any new/worsening signs or symptoms.     You were seen in the Emergency Department for pregnancy.     1) Advance activity as tolerated.   2) Continue all previously prescribed medications as directed.    3) Follow up with your primary care physician in 24-48 hours - take copies of your results.    4) Return to the Emergency Department for worsening or persistent symptoms, and/or ANY NEW OR CONCERNING SYMPTOMS.    The ultrasound shows a pregnancy in the uterus. Please follow up with an obstetrician for routine monitoring of your pregnancy. return to the ER for any significant abdominal pain, pain with urination, vaginal bleeding, shortness of breath, chest pain, or any other new or concerning symptoms. Usted fue vista en el Departamento de Emergencias por embarazo.    1) Avance en la actividad según lo tolere.  2) Continúe con todos los medicamentos recetados previamente según las indicaciones.  3) Alex un seguimiento con shankar médico de atención primaria en 24 a 48 horas: tome copias de leonel resultados.  4) Regrese al Departamento de Emergencias por empeoramiento o síntomas persistentes, y/o CUALQUIER SÍNTOMA NUEVO O PREOCUPANTE.    tiene bacterias en la orina que pueden ser peligrosas charmaine el embarazo. por favor tome el curso completo de antibióticos.    La ecografía muestra un embarazo en el útero. Alex un seguimiento con un obstetra para el control de rutina de shankar embarazo. regrese a la kyara de emergencias por cualquier dolor abdominal significativo, dolor al orinar, sangrado vaginal, dificultad para respirar, dolor en el pecho o cualquier otro síntoma nuevo o preocupante.    Dolor abdominal charmaine el embarazo  Abdominal Pain During Pregnancy       El dolor de vientre (abdominal) es habitual charmaine el embarazo. Hay muchas causas posibles. Generalmente, no se trata de un problema grave. Otras veces puede ser un signo de que algo no shirley emily. Siempre comuníquese con shankar médico si tiene dolor abdominal.    Siga estas indicaciones en shankar casa:  No tenga relaciones sexuales ni se coloque nada dentro de la vagina hasta que el dolor haya desaparecido completamente.  Descanse todo lo que pueda hasta que el dolor se le haya calmado.  Blaire suficiente líquido rudy para mantener el pis (orina) de color amarillo pálido.  [place] los medicamentos de venta re y los recetados solamente rudy se lo haya indicado el médico.  Concurra a todas las visitas de control rudy se lo haya indicado el médico. [place] es importante.    Comuníquese con un médico si:  El dolor continúa o empeora después de descansar.  Siente dolor en la parte inferior del abdomen que:    Va y viene en intervalos regulares.  Se extiende a la espalda.  Se siente rudy cólicos menstruales.  Siente dolor o ardor al orinar.    Solicite ayuda de inmediato si:  Tiene fiebre o siente escalofríos.  Tiene harry hemorragia vaginal abundante.  Tiene harry pérdida de líquido por la vagina.  Elimina tejidos por la vagina.  Vomita charmaine más de 24 horas.  Tiene heces líquidas (diarrea) charmaine más de 24 horas.  El bebé se mueve menos de lo habitual.  Se siente débil o se desmaya.  Le falta el aire.  Tiene dolor muy intenso en la parte superior del abdomen.    Resumen  El dolor de vientre (abdominal) es habitual charmaine el embarazo. Hay muchas causas posibles.  Si tiene dolor en el abdomen charmaine el embarazo, avise al médico de inmediato.  Concurra a todas las visitas de control rudy se lo haya indicado el médico. [place] es importante.    NOTAS ADICIONALES E INSTRUCCIONES    Please follow up with your Primary MD in 24-48 hr.  Seek immediate medical care for any new/worsening signs or symptoms.     You were seen in the Emergency Department for pregnancy.     1) Advance activity as tolerated.   2) Continue all previously prescribed medications as directed.    3) Follow up with your primary care physician in 24-48 hours - take copies of your results.    4) Return to the Emergency Department for worsening or persistent symptoms, and/or ANY NEW OR CONCERNING SYMPTOMS.    The ultrasound shows a pregnancy in the uterus. Please follow up with an obstetrician for routine monitoring of your pregnancy. return to the ER for any significant abdominal pain, pain with urination, vaginal bleeding, shortness of breath, chest pain, or any other new or concerning symptoms.

## 2023-07-24 NOTE — ED PROVIDER NOTE - CLINICAL SUMMARY MEDICAL DECISION MAKING FREE TEXT BOX
Well-appearing female presents with no complaints for evaluation of pregnancy of unknown location.  Prior ultrasounds with abnormal lesion in the ovary.  Low clinical suspicion for acute ectopic or perforated cyst.  Mild tenderness over the suture site.  While palpating the right lower quadrant over intact skin, no tenderness, consistent with isolated suture site pain.  Will obtain follow-up labs and ultrasound as well as urinalysis.  likely DC

## 2023-07-24 NOTE — ED PROVIDER NOTE - PHYSICAL EXAMINATION
General: non-toxic, NAD  HEENT: NCAT, PERRL, oropharyngeal AW patent, no conjunctival pallor, moist mucous membranes  Cardiac: RRR, no murmurs, 2+ peripheral pulses  Resp: CTAB, normal rate  Abdomen: soft, non-distended, bowel sounds present, no ttp, no rebound or guarding. no organomegaly  Extremities: no peripheral edema, calf tenderness, or leg size discrepancies  Skin: warm and dry no rashes  Neuro: AAOx4, 5+motor, sensation grossly intact  Psych: mood and affect appropriate

## 2023-07-26 LAB
CULTURE RESULTS: SIGNIFICANT CHANGE UP
SPECIMEN SOURCE: SIGNIFICANT CHANGE UP
